# Patient Record
Sex: FEMALE | Race: WHITE | Employment: FULL TIME | URBAN - METROPOLITAN AREA
[De-identification: names, ages, dates, MRNs, and addresses within clinical notes are randomized per-mention and may not be internally consistent; named-entity substitution may affect disease eponyms.]

---

## 2020-07-16 ENCOUNTER — OFFICE VISIT (OUTPATIENT)
Dept: PODIATRY | Facility: CLINIC | Age: 40
End: 2020-07-16
Payer: COMMERCIAL

## 2020-07-16 VITALS — WEIGHT: 150 LBS | BODY MASS INDEX: 22.73 KG/M2 | HEIGHT: 68 IN

## 2020-07-16 DIAGNOSIS — M79.672 LEFT FOOT PAIN: Primary | ICD-10-CM

## 2020-07-16 DIAGNOSIS — B07.0 PLANTAR WART OF LEFT FOOT: ICD-10-CM

## 2020-07-16 PROCEDURE — 99203 OFFICE O/P NEW LOW 30 MIN: CPT | Mod: 25 | Performed by: PODIATRIST

## 2020-07-16 PROCEDURE — 17110 DESTRUCTION B9 LES UP TO 14: CPT | Performed by: PODIATRIST

## 2020-07-16 RX ORDER — IMIQUIMOD 12.5 MG/.25G
CREAM TOPICAL
Qty: 12 PACKET | Refills: 3 | Status: SHIPPED | OUTPATIENT
Start: 2020-07-16

## 2020-07-16 ASSESSMENT — MIFFLIN-ST. JEOR: SCORE: 1400.96

## 2020-07-16 NOTE — PATIENT INSTRUCTIONS
Thank you for choosing [facility] / Foot & Ankle Surgery!    DR. MERRITT'S CLINIC SCHEDULE  MONDAY AM - [place] TUESDAY - [place]   [address] [address] [place] [address]   [phone] / [phone] / [phone]       WEDNESDAY - [place] FRIDAY AM - WOUND [facility]   [address]   [place] [address] [place] [address]   [phone] / [phone]       FRIDAY PM - [place] SCHEDULE SURGERY: [phone]   [address] BILLING QUESTIONS: [phone]   [place] [address] AFTER HOURS: [phone] CONSUMER PRICE LINE: [phone]     APPOINTMENTS: [phone]     Follow up:  As needed  Diagnosis: Plantar Wart  Next steps: Read handout below :)    Please read through the following handouts and if you have any questions, please feel free to call us or send a [employer] message!        PLANTAR WARTS   Plantar warts are a viral skin infection. As with most viral infections, there is no cure, only treatment. The virus is also quite superficial, so the immune system cannot recognize it as a problem. Therefore, treatment is aimed at causing an insult that the immune system can recognize. Typically plantar warts are treated by applying liquid nitrogen, to cause a local frostbite injury, or a strong acid, to cause a blister. Sometimes medications or creams that boost immune response are prescribed.     If your treatment was with the strong acid (phenol, tri-chlor, cantharadine), you will likely develop a very tender, brown fluid-filled blister. This is normal and the blister should be allowed to break on its own and dry out. Once it is dried out, use a pumice stone to trim away the dry skin and use an over-the-counter salicylic acid product in between appointments. Call the clinic if you have any concerns regarding your blister.     The regimen between  "office visits should include: daily trimming with the pumice stone, application of the OTC product, and dressing with a small band-aid or piece of duct tape. You should repeat this daily until your next visit in 2-3 weeks. There is a prescription cream as well (Aldera) that can be applied between visits. This can sometimes cause surrounding skin irritation.    Duct tape is a non invasive treatment to warts. Usually requires reapplying it every night. It helps to \"choke out\" the wart. Trimming the wart down with a razor first may also help.     Again, because there is no cure for warts, you may have 6 or more visits depending on how your wart responds. Please call the clinic if you have questions or concerns.                 BODY WEIGHT AND YOUR FEET  The following information is included in the after visit summary for all patients. Body weight can be a sensitive issue to discuss in clinic, but we think the following information is very important. Although we focus on the feet and ankles, we do support the overall health of our patients.     Many things can cause foot and ankle problems. Foot structure, activity level, foot mechanics and injuries are common causes of pain. One very important issue that often goes unmentioned, is body weight. Extra weight can cause increased stress on muscles, ligaments, bones and tendons. Sometimes just a few extra pounds is all it takes to put one over her/his threshold. Without reducing that stress, it can be difficult to alleviate pain. As Foot & Ankle specialists, our job is addressing the lower extremity problem and possible causes. Regarding extra body weight, we encourage patients to discuss diet and weight management plans with their primary care doctors. It is this team approach that gives you the best opportunity for pain relief and getting you back on your feet.      [facility] has a Comprehensive Weight Management Program. This program includes counseling, education, " non-surgical and surgical approaches to weight loss. If you are interested in learning more either talk to you primary care provider or call [phone].

## 2020-07-16 NOTE — LETTER
7/16/2020         RE: Meghan Mireles  [address]  [facility] [address]        Dear Colleague,    Thank you for referring your patient, Meghan Mireles, to the [facility]. Please see a copy of my visit note below.    PATIENT HISTORY:    Meghan Mireles is a 39 year old female who presents to clinic for painful left plantar heel wart.  She is tried over-the-counter medications but it has not helped.  She has had this for about 18 months.  She is also started to note some pain to the distal aspect of her feet on the ball of the foot.  She walks more on this area and is wondering if she is compensating due to the pain full wart on the heel.  Pain can be 8 out of 10 at its worst.  She denies injury.  Wondering what can be done for the wart.    Review of Systems:  Patient denies fever, chills, rash, wound, stiffness, numbness, weakness, heart burn, blood in stool, chest pain with activity, calf pain when walking, shortness of breath with activity, chronic cough, easy bleeding/bruising, swelling of ankles, excessive thirst, fatigue, depression, anxiety.  Patient admits to limping at times.     PAST MEDICAL HISTORY: No past medical history on file.     PAST SURGICAL HISTORY: No past surgical history on file.     MEDICATIONS: No current outpatient medications on file.     ALLERGIES:  No Known Allergies     SOCIAL HISTORY:   Social History     Socioeconomic History     Marital status:      Spouse name: Not on file     Number of children: Not on file     Years of education: Not on file     Highest education level: Not on file   Occupational History     Not on file   Social Needs     Financial resource strain: Not on file     Food insecurity     Worry: Not on file     Inability: Not on file     Transportation needs     Medical: Not on file     Non-medical: Not on file   Tobacco Use     Smoking status: Never Smoker     Smokeless tobacco: Never Used   Substance and Sexual Activity     Alcohol  "use: Not on file     Drug use: Not on file     Sexual activity: Not on file   Lifestyle     Physical activity     Days per week: Not on file     Minutes per session: Not on file     Stress: Not on file   Relationships     Social connections     Talks on phone: Not on file     Gets together: Not on file     Attends Latter-day service: Not on file     Active member of club or organization: Not on file     Attends meetings of clubs or organizations: Not on file     Relationship status: Not on file     Intimate partner violence     Fear of current or ex partner: Not on file     Emotionally abused: Not on file     Physically abused: Not on file     Forced sexual activity: Not on file   Other Topics Concern     Not on file   Social History Narrative     Not on file        FAMILY HISTORY: No family history on file.     EXAM:Vitals: Ht 1.715 m (5' 7.5\")   Wt 68 kg (150 lb)   BMI 23.15 kg/m    BMI= Body mass index is 23.15 kg/m .    General appearance: Patient is alert and fully cooperative with history & exam.  No sign of distress is noted during the visit.     Psychiatric: Affect is pleasant & appropriate.  Patient appears motivated to improve health.     Respiratory: Breathing is regular & unlabored while sitting.     HEENT: Hearing is intact to spoken word.  Speech is clear.  No gross evidence of visual impairment that would impact ambulation.     Dermatologic: Localized cauliflower and lesion to the plantar aspect of the left heel.  This represents a plantar wart.  No open lesions or acute signs of infection noted.     Vascular: DP & PT pulses are intact & regular bilaterally.  No significant edema or varicosities noted.  CFT and skin temperature is normal to both lower extremities.     Neurologic: Lower extremity sensation is intact to light touch.  No evidence of weakness or contracture in the lower extremities.  No evidence of neuropathy.     Musculoskeletal: Patient is ambulatory without assistive device or brace.  " Minimal pain on palpation to the plantar distal left 2nd metatarsal and plantar heel.      ASSESSMENT:    Left foot pain  Plantar wart of left foot     PLAN:  Reviewed patient's chart in epic. Discussed causes and treatments of warts including freezing, aldera cream, shaving them down, acid, curretting them out, and monitoring.  Also discussed that there is nothing that is 100% effective at getting rid of warts and a majority of them go away on their own over time.    She would like it treated with liquid nitrogen today.  We will have her keep the Band-Aid on for the next week.  After a week she will use a pumice stone and start to treat with imiquimod cream if it still there.  All questions were answered to patient satisfaction she will call further questions or concerns.  We discussed that the pain to the distal aspect of her foot is likely compensating from not putting weight on the heel due to the pain of the plantar wart.  She will continue using over-the-counter inserts at this time.    Procedure: After verbal consent, Using a # 15 blade, the wart x 1 was debrided to pinpoint bleeding.  Three 5 second applications of liquid nitrogen were then applied to the warts.  Patient tolerated the procedure well.    Iona Bee DPM, Podiatry/Foot and Ankle Surgery          Again, thank you for allowing me to participate in the care of your patient.        Sincerely,        Iona Bee DPM, Podiatry/Foot and Ankle Surgery

## 2020-07-16 NOTE — PROGRESS NOTES
PATIENT HISTORY:    Meghan Mireles is a 39 year old female who presents to clinic for painful left plantar heel wart.  She is tried over-the-counter medications but it has not helped.  She has had this for about 18 months.  She is also started to note some pain to the distal aspect of her feet on the ball of the foot.  She walks more on this area and is wondering if she is compensating due to the pain full wart on the heel.  Pain can be 8 out of 10 at its worst.  She denies injury.  Wondering what can be done for the wart.    Review of Systems:  Patient denies fever, chills, rash, wound, stiffness, numbness, weakness, heart burn, blood in stool, chest pain with activity, calf pain when walking, shortness of breath with activity, chronic cough, easy bleeding/bruising, swelling of ankles, excessive thirst, fatigue, depression, anxiety.  Patient admits to limping at times.     PAST MEDICAL HISTORY: No past medical history on file.     PAST SURGICAL HISTORY: No past surgical history on file.     MEDICATIONS: No current outpatient medications on file.     ALLERGIES:  No Known Allergies     SOCIAL HISTORY:   Social History     Socioeconomic History     Marital status:      Spouse name: Not on file     Number of children: Not on file     Years of education: Not on file     Highest education level: Not on file   Occupational History     Not on file   Social Needs     Financial resource strain: Not on file     Food insecurity     Worry: Not on file     Inability: Not on file     Transportation needs     Medical: Not on file     Non-medical: Not on file   Tobacco Use     Smoking status: Never Smoker     Smokeless tobacco: Never Used   Substance and Sexual Activity     Alcohol use: Not on file     Drug use: Not on file     Sexual activity: Not on file   Lifestyle     Physical activity     Days per week: Not on file     Minutes per session: Not on file     Stress: Not on file   Relationships     Social connections      "Talks on phone: Not on file     Gets together: Not on file     Attends Worship service: Not on file     Active member of club or organization: Not on file     Attends meetings of clubs or organizations: Not on file     Relationship status: Not on file     Intimate partner violence     Fear of current or ex partner: Not on file     Emotionally abused: Not on file     Physically abused: Not on file     Forced sexual activity: Not on file   Other Topics Concern     Not on file   Social History Narrative     Not on file        FAMILY HISTORY: No family history on file.     EXAM:Vitals: Ht 1.715 m (5' 7.5\")   Wt 68 kg (150 lb)   BMI 23.15 kg/m    BMI= Body mass index is 23.15 kg/m .    General appearance: Patient is alert and fully cooperative with history & exam.  No sign of distress is noted during the visit.     Psychiatric: Affect is pleasant & appropriate.  Patient appears motivated to improve health.     Respiratory: Breathing is regular & unlabored while sitting.     HEENT: Hearing is intact to spoken word.  Speech is clear.  No gross evidence of visual impairment that would impact ambulation.     Dermatologic: Localized cauliflower and lesion to the plantar aspect of the left heel.  This represents a plantar wart.  No open lesions or acute signs of infection noted.     Vascular: DP & PT pulses are intact & regular bilaterally.  No significant edema or varicosities noted.  CFT and skin temperature is normal to both lower extremities.     Neurologic: Lower extremity sensation is intact to light touch.  No evidence of weakness or contracture in the lower extremities.  No evidence of neuropathy.     Musculoskeletal: Patient is ambulatory without assistive device or brace.  Minimal pain on palpation to the plantar distal left 2nd metatarsal and plantar heel.      ASSESSMENT:    Left foot pain  Plantar wart of left foot     PLAN:  Reviewed patient's chart in epic. Discussed causes and treatments of warts including " freezing, aldera cream, shaving them down, acid, curretting them out, and monitoring.  Also discussed that there is nothing that is 100% effective at getting rid of warts and a majority of them go away on their own over time.    She would like it treated with liquid nitrogen today.  We will have her keep the Band-Aid on for the next week.  After a week she will use a pumice stone and start to treat with imiquimod cream if it still there.  All questions were answered to patient satisfaction she will call further questions or concerns.  We discussed that the pain to the distal aspect of her foot is likely compensating from not putting weight on the heel due to the pain of the plantar wart.  She will continue using over-the-counter inserts at this time.    Procedure: After verbal consent, Using a # 15 blade, the wart x 1 was debrided to pinpoint bleeding.  Three 5 second applications of liquid nitrogen were then applied to the warts.  Patient tolerated the procedure well.    Iona Bee DPM, Podiatry/Foot and Ankle Surgery